# Patient Record
Sex: MALE | Race: AMERICAN INDIAN OR ALASKA NATIVE
[De-identification: names, ages, dates, MRNs, and addresses within clinical notes are randomized per-mention and may not be internally consistent; named-entity substitution may affect disease eponyms.]

---

## 2020-04-25 ENCOUNTER — HOSPITAL ENCOUNTER (EMERGENCY)
Dept: HOSPITAL 5 - ED | Age: 30
Discharge: HOME | End: 2020-04-25
Payer: SELF-PAY

## 2020-04-25 VITALS — DIASTOLIC BLOOD PRESSURE: 98 MMHG | SYSTOLIC BLOOD PRESSURE: 135 MMHG
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
## 2020-04-25 DIAGNOSIS — Z79.899: ICD-10-CM
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
## 2020-04-25 DIAGNOSIS — F17.200: ICD-10-CM

## 2020-04-25 DIAGNOSIS — S42.032A: Primary | ICD-10-CM
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
## 2020-04-25 PROCEDURE — 99283 EMERGENCY DEPT VISIT LOW MDM: CPT

## 2020-04-25 NOTE — XRAY REPORT
LEFT SHOULDER, 2 VIEWS





INDICATION / CLINICAL INFORMATION:

MAIN: Injured left shoulder playing football TODAY.



COMPARISON:

None available.



FINDINGS:

No glenohumeral fracture or dislocation.

Comminuted and significantly displaced distal left clavicle fracture.



Signer Name: Vito Torres MD 

Signed: 4/25/2020 8:39 PM

Workstation Name: [id number]

## 2020-04-25 NOTE — EMERGENCY DEPARTMENT REPORT
ED Upper Extremity Inj HPI





- General


Chief Complaint: Extremity Injury, Upper


Stated Complaint: LEFT SHOULDER INJURY


Time Seen by Provider: 20 20:41


Source: patient


Mode of arrival: Ambulatory


Limitations: No Limitations





- History of Present Illness


Initial Comments: 





Is a very pleasant 29-year-old male who presents to the emergency department 

with chief complaint of left shoulder pain.  Patient reports he was playing 

football with his son when he jumped stretching outward to try and catch a ball 

and landed on his left shoulder.  He denies hitting his head or losing 

consciousness.  He reports the pain in his left shoulder is rated a 9 out of 10 

in severity and is aggravated with any movement of the left shoulder.  Pain is 

described as throbbing and dull.  No radiating pain.  Specifically he has pain 

when he abducts the left shoulder.  He denies any radiating pain.  He denies any

known past medical history, current medication use or known allergies to 

medications.





- Related Data


                                  Previous Rx's











 Medication  Instructions  Recorded  Last Taken  Type


 


Acetaminophen with Codeine 1 each PO Q6HR #15 tablet 20 Unknown Rx





[Acetaminophen-Codeine #4 TAB]    











                                    Allergies











Allergy/AdvReac Type Severity Reaction Status Date / Time


 


No Known Allergies Allergy   Unverified 20 19:53














ED Review of Systems


ROS: 


Stated complaint: LEFT SHOULDER INJURY


Other details as noted in HPI





Comment: All other systems reviewed and negative


Constitutional: denies: chills, fever


Eyes: denies: eye pain, eye discharge, vision change


ENT: denies: ear pain, throat pain


Respiratory: denies: cough, shortness of breath, wheezing


Cardiovascular: denies: chest pain, palpitations


Endocrine: no symptoms reported


Gastrointestinal: denies: abdominal pain, nausea, diarrhea


Genitourinary: denies: urgency, dysuria


Musculoskeletal: as per HPI, arthralgia.  denies: back pain, joint swelling


Skin: denies: rash, lesions


Neurological: denies: headache, weakness, paresthesias


Psychiatric: denies: anxiety, depression


Hematological/Lymphatic: denies: easy bleeding, easy bruising





ED Past Medical Hx





- Past Medical History


Previous Medical History?: No





- Surgical History


Past Surgical History?: No





- Social History


Smoking Status: Current Every Day Smoker





- Medications


Home Medications: 


                                Home Medications











 Medication  Instructions  Recorded  Confirmed  Last Taken  Type


 


Acetaminophen with Codeine 1 each PO Q6HR #15 tablet 20  Unknown Rx





[Acetaminophen-Codeine #4 TAB]     














ED Physical Exam





- General


Limitations: No Limitations


General appearance: alert, in no apparent distress





- Head


Head exam: Present: atraumatic, normocephalic





- Eye


Eye exam: Present: normal appearance, PERRL, EOMI


Pupils: Present: normal accommodation





- ENT


ENT exam: Present: normal exam, normal orophraynx, mucous membranes moist, 

normal external ear exam





- Neck


Neck exam: Present: normal inspection, full ROM.  Absent: tenderness, 

meningismus





- Respiratory


Respiratory exam: Present: normal lung sounds bilaterally.  Absent: respiratory 

distress, wheezes, rales, rhonchi, stridor





- Cardiovascular


Cardiovascular Exam: Present: regular rate, normal rhythm, normal heart sounds. 

Absent: systolic murmur, diastolic murmur, rubs, gallop





- GI/Abdominal


GI/Abdominal exam: Present: soft, normal bowel sounds.  Absent: distended, ten

derness, guarding, rebound, rigid





- Rectal


Rectal exam: Present: deferred





- Extremities Exam


Extremities exam: Present: normal inspection, tenderness (There is tenderness to

the distal left clavicle near the AC joint.  There is pain with abduction of the

left shoulder.  There are normal radial pulses.  There is normal full active 

range of motion of the wrist, elbow without pain.  There is no tenderness to the

glenohumeral joint.  There is no midline tenderness of the cervical, thoracic or

lumbar spine.  There are normal distal sensation and cap refill.)





- Back Exam


Back exam: Present: normal inspection





- Neurological Exam


Neurological exam: Present: alert, oriented X3





- Psychiatric


Psychiatric exam: Present: normal affect, normal mood





- Skin


Skin exam: Present: warm, dry, intact, normal color.  Absent: rash





ED Course





                                   Vital Signs











  20





  19:55


 


Temperature 98.3 F


 


Pulse Rate 94 H


 


Respiratory 18





Rate 


 


Blood Pressure 135/98


 


O2 Sat by Pulse 100





Oximetry 














ED Medical Decision Making





- Radiology Data


Radiology results: report reviewed, image reviewed





XRay Report 


Signed 





 Patient: MANDO BARBOSA MR#: [id number] 


 [id number] 


 : 1990 Acct:[id number] 





 Age/Sex: 29 / M ADM Date: 20 





 Loc: ED 


 Attending Dr: 








 Ordering Physician: JATIN CORRALES 


 Date of Service: 20 


 Procedure(s): XR shoulder 2+V LT 


 Accession Number(s): [id number] 





 cc: JATIN CORRALES 





 Fluoro Time In Minutes: 





 LEFT SHOULDER, 2 VIEWS 








INDICATION / CLINICAL INFORMATION: 


MAIN: Injured left shoulder playing football TODAY. 





COMPARISON: 


None available. 





FINDINGS: 


No glenohumeral fracture or dislocation. 


Comminuted and significantly displaced distal left clavicle fracture. 





Signer Name: Vito Torres MD 


Signed: 2020 8:39 PM 


Workstation Name: [id number] 








 Transcribed By: GA 


 Dictated By: Vito Torres MD 


 Electronically Authenticated By: Vito Torres MD 


 Signed Date/Time: 20 








- Medical Decision Making





Patient is nontoxic in no acute distress.  X-ray of the left shoulder revealed a

comminuted slightly displaced distal left clavicle fracture.  The glenohumeral 

joint appears to be intact.  Patient is neuro and vascularly intact.  The 

axillary nerve sensation is intact.  Patient was placed in a shoulder 

immobilizer by me.  The application was appropriate and the neuro and vascular 

exam pre-and post application was normal.  The patient did have a small abrasion

to the left upper shoulder that did not communicate with the fracture.  This was

not an open fracture.  I recommended a tetanus shot however the patient politely

declined stating he was afraid of needles.  He understood the risk of not 

getting a tetanus shot including getting tetanus which is highly fatal.  He 

understood these risks and preferred to follow-up.  I will refer him to 

orthopedics and he understood that due to the fracture being close to the AC 

joint was highly important that he follows up with an orthopedist to avoid 

chronic pain, deformity of the left arm.  He was instructed to return to the 

emergency department immediately if he develops any changing or worsening 

symptoms.  Patient was neurally and vascularly intact with no evidence of 

vascular injury.  Highly recommended follow-up with orthopedic specialist.  He 

verbalized understanding of the diagnosis, treatment plan and follow-up 

instructions and all of his questions were answered.





- Differential Diagnosis


Fracture, strain, sprain


Critical care attestation.: 


If time is entered above; I have spent that time in minutes in the direct care 

of this critically ill patient, excluding procedure time.








ED Disposition


Clinical Impression: 


Closed left clavicular fracture


Qualifiers:


 Encounter type: initial encounter Clavicle location: lateral end Fracture 

alignment: displaced Qualified Code(s): S42.032A - Displaced fracture of lateral

end of left clavicle, initial encounter for closed fracture





Disposition:  TO HOME OR SELFCARE


Is pt being admited?: No


Condition: Stable


Instructions:  Clavicle Fracture (ED)


Prescriptions: 


Acetaminophen with Codeine [Acetaminophen-Codeine #4 TAB] 1 each PO Q6HR #15 

tablet


Referrals: 


BLAINE HERNANDEZ MD [Staff Physician] - 3-5 Days


[facility] [Provider Group] - 3-5 Days


ABBIE FLORES MD [Staff Physician] - 3-5 Days


Time of Disposition: 21:05